# Patient Record
Sex: MALE | Race: WHITE
[De-identification: names, ages, dates, MRNs, and addresses within clinical notes are randomized per-mention and may not be internally consistent; named-entity substitution may affect disease eponyms.]

---

## 2020-02-10 ENCOUNTER — HOSPITAL ENCOUNTER (INPATIENT)
Dept: HOSPITAL 4 - SED | Age: 45
LOS: 8 days | Discharge: LEFT BEFORE DISCHARGE | DRG: 710 | End: 2020-02-18
Attending: INTERNAL MEDICINE | Admitting: INTERNAL MEDICINE
Payer: MEDICAID

## 2020-02-10 VITALS — SYSTOLIC BLOOD PRESSURE: 155 MMHG

## 2020-02-10 VITALS — BODY MASS INDEX: 41.78 KG/M2 | WEIGHT: 260 LBS | HEIGHT: 66 IN

## 2020-02-10 DIAGNOSIS — E87.1: ICD-10-CM

## 2020-02-10 DIAGNOSIS — I10: ICD-10-CM

## 2020-02-10 DIAGNOSIS — L03.114: ICD-10-CM
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
## 2020-02-10 DIAGNOSIS — A41.9: Primary | ICD-10-CM

## 2020-02-10 DIAGNOSIS — R73.03: ICD-10-CM

## 2020-02-10 DIAGNOSIS — F10.231: ICD-10-CM

## 2020-02-10 DIAGNOSIS — F29: ICD-10-CM

## 2020-02-10 DIAGNOSIS — E66.01: ICD-10-CM

## 2020-02-10 DIAGNOSIS — M71.122: ICD-10-CM

## 2020-02-10 LAB
ALBUMIN SERPL BCP-MCNC: 3.4 G/DL (ref 3.4–4.8)
ALT SERPL W P-5'-P-CCNC: 86 U/L (ref 12–78)
ANION GAP SERPL CALCULATED.3IONS-SCNC: 11 MMOL/L (ref 5–15)
AST SERPL W P-5'-P-CCNC: 38 U/L (ref 10–37)
BILIRUB SERPL-MCNC: 1.6 MG/DL (ref 0–1)
BUN SERPL-MCNC: 10 MG/DL (ref 8–21)
CALCIUM SERPL-MCNC: 8.7 MG/DL (ref 8.4–11)
CHLORIDE SERPL-SCNC: 87 MMOL/L (ref 98–107)
CREAT SERPL-MCNC: 0.81 MG/DL (ref 0.55–1.3)
ERYTHROCYTE [DISTWIDTH] IN BLOOD BY AUTOMATED COUNT: 12.9 % (ref 9–15)
GFR SERPL CREATININE-BSD FRML MDRD: 134 ML/MIN (ref 90–?)
GLUCOSE SERPL-MCNC: 185 MG/DL (ref 70–99)
HCT VFR BLD AUTO: 43.2 % (ref 36–54)
HGB BLD-MCNC: 14.8 G/DL (ref 14–18)
LIPASE SERPL-CCNC: 133 U/L (ref 73–393)
MCH RBC QN AUTO: 35 PG (ref 27–31)
MCHC RBC AUTO-ENTMCNC: 34 % (ref 32–36)
MCV RBC AUTO: 102 FL (ref 79–98)
PLATELET # BLD AUTO: 277 K/UL (ref 130–430)
POTASSIUM SERPL-SCNC: 3.1 MMOL/L (ref 3.5–5.1)
RBC # BLD AUTO: 4.23 MIL/UL (ref 4.2–6.2)
SODIUM SERPLBLD-SCNC: 122 MMOL/L (ref 136–145)
WBC # BLD AUTO: 26.8 K/UL (ref 4.8–10.8)

## 2020-02-10 PROCEDURE — G0482 DRUG TEST DEF 15-21 CLASSES: HCPCS

## 2020-02-10 RX ADMIN — SODIUM CHLORIDE SCH MLS/HR: 9 INJECTION, SOLUTION INTRAVENOUS at 23:39

## 2020-02-10 NOTE — NUR
Pt received resting in bed awoke alert OX1 to name Swedish speaking, able to make needs 
known. Pt was given Tylenol prior to the registry nurse receiving this pt. Pt is confused 
keep asking for Tylenol.

## 2020-02-10 NOTE — NUR
Pt  was assisted to the bathroom with a walker and staff. Ambulate with steady gait. No 
longer asking for Tylenol and is resting back in bed comfortably. Bed is in low position 
with wheels locked call light in reach.

## 2020-02-11 VITALS — SYSTOLIC BLOOD PRESSURE: 148 MMHG

## 2020-02-11 VITALS — SYSTOLIC BLOOD PRESSURE: 135 MMHG

## 2020-02-11 VITALS — SYSTOLIC BLOOD PRESSURE: 138 MMHG

## 2020-02-11 VITALS — SYSTOLIC BLOOD PRESSURE: 139 MMHG

## 2020-02-11 LAB
ANION GAP SERPL CALCULATED.3IONS-SCNC: 7 MMOL/L (ref 5–15)
BASOPHILS # BLD AUTO: 0.1 K/UL (ref 0–0.2)
BASOPHILS NFR BLD AUTO: 0.2 % (ref 0–2)
BASOPHILS NFR BLD MANUAL: 0 % (ref 0–2)
BUN SERPL-MCNC: 8 MG/DL (ref 8–21)
CALCIUM SERPL-MCNC: 8.4 MG/DL (ref 8.4–11)
CHLORIDE SERPL-SCNC: 94 MMOL/L (ref 98–107)
CHOLEST SERPL-MCNC: 166 MG/DL (ref ?–200)
CREAT SERPL-MCNC: 0.6 MG/DL (ref 0.55–1.3)
CRP SERPL-MCNC: 15.3 MG/DL (ref 0–0.5)
EOSINOPHIL # BLD AUTO: 0 K/UL (ref 0–0.4)
EOSINOPHIL NFR BLD AUTO: 0 % (ref 0–4)
EOSINOPHIL NFR BLD MANUAL: 0 % (ref 0–7)
ERYTHROCYTE [DISTWIDTH] IN BLOOD BY AUTOMATED COUNT: 13 % (ref 9–15)
GFR SERPL CREATININE-BSD FRML MDRD: 189 ML/MIN (ref 90–?)
GLUCOSE SERPL-MCNC: 139 MG/DL (ref 70–99)
HCT VFR BLD AUTO: 40.2 % (ref 36–54)
HDLC SERPL-MCNC: 68 MG/DL (ref 45–?)
HGB BLD-MCNC: 13.7 G/DL (ref 14–18)
LDL CHOLESTEROL: 77 MG/DL (ref ?–100)
LYMPHOCYTES # BLD AUTO: 0.8 K/UL (ref 1–5.5)
LYMPHOCYTES NFR BLD AUTO: 3.7 % (ref 20.5–51.5)
LYMPHOCYTES NFR BLD MANUAL: 2 % (ref 20–46)
MCH RBC QN AUTO: 35 PG (ref 27–31)
MCHC RBC AUTO-ENTMCNC: 34 % (ref 32–36)
MCV RBC AUTO: 103 FL (ref 79–98)
MONOCYTES # BLD MANUAL: 1.1 K/UL (ref 0–1)
MONOCYTES # BLD MANUAL: 10 % (ref 0–11)
MONOCYTES # BLD MANUAL: 5.1 % (ref 1.7–9.3)
NEUTROPHILS # BLD AUTO: 20.2 K/UL (ref 1.8–7.7)
NEUTROPHILS NFR BLD AUTO: 91 % (ref 40–70)
NEUTS BAND NFR BLD MANUAL: 12 % (ref 0–6)
PLATELET # BLD AUTO: 245 K/UL (ref 130–430)
POTASSIUM SERPL-SCNC: 3.2 MMOL/L (ref 3.5–5.1)
RBC # BLD AUTO: 3.9 MIL/UL (ref 4.2–6.2)
SODIUM SERPLBLD-SCNC: 127 MMOL/L (ref 136–145)
TRIGL SERPL-MCNC: 72 MG/DL (ref 30–150)
WBC # BLD AUTO: 22.2 K/UL (ref 4.8–10.8)

## 2020-02-11 RX ADMIN — Medication SCH EA: at 12:54

## 2020-02-11 RX ADMIN — SODIUM CHLORIDE SCH MLS/HR: 9 INJECTION, SOLUTION INTRAVENOUS at 13:12

## 2020-02-11 RX ADMIN — SODIUM CHLORIDE SCH MLS/HR: 9 INJECTION, SOLUTION INTRAVENOUS at 00:42

## 2020-02-11 RX ADMIN — SODIUM CHLORIDE SCH MLS/HR: 9 INJECTION, SOLUTION INTRAVENOUS at 20:36

## 2020-02-11 RX ADMIN — HUMAN INSULIN PRN UNITS: 100 INJECTION, SOLUTION SUBCUTANEOUS at 12:48

## 2020-02-11 NOTE — NUR
Pt remain resting in bed with eyes closed respiration even unlabored on room air and had an 
occasional dry cough. Family member remain with the pt at bed side. Will endorse to on 
coming daily shift nurse.

## 2020-02-11 NOTE — NUR
Pt received from [facility] AAOX[facility] lungs clear, abdomen soft, skin left forearm edema, reddish 
brown with small drainage. Pt's skin otherwise intact. Pt denies having pain was upt tp 
bathroom X1 voided. Family member at bed side. Pt's bed kept in low position with wheels 
locked call light in reach.

## 2020-02-11 NOTE — NUR
Patient will be admitted to care of Dr. Martin.  Admitted to Medsurg unit.  
Will go to room 124A.  Belongings list completed.  Complete and up to date 
summary report printed. SBAR report to be given at bedside with opportunity for 
questions.

## 2020-02-11 NOTE — NUR
Pt BIB family to ED C/O gradually worsening, constant left elbow pain with 
associated swelling for the past 3 days. Pain is mild, nonradiating. No 
alleviating or exacerbating factors. Patient reports using cold compress with 
no relief. 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 
lot No other complaints and or injuries noted VSS no s/s of acute distress 
Resting on gurney rails up

## 2020-02-11 NOTE — NUR
SS NOTES/DCP:



MSW was referred by nursing to see patient for DCP and homelessness. Demographic information 
confirmed (phone number: [phone]). No PCP.



MSW met with patient at bedside with girlfriend Kim around; pt ok'd. Pt was 
cooperative, alert and oriented x4. Pt appeared to be disheveled with normal mood, normal 
speeach, average eye contact and appropriate affect. 



Pt is a 45 y/o single  male who came in via ED for a bug bit on his left elbow 3 
days ago. Pt  is homeless and has been homeless for 4 years. Pt states he used to live in 
[place] but moved to [place] to be closer to his family. Pt states due to his drinking, 
his relationship with his family has been strained but does not believe that he has "no 
drinking problem". Pt states he is independent with his ADL's and does require/own any DME. 
Pt states he drinks 4 cans of beer/day for 10 years and believes he doesn't have a drinking 
problem. Pt states his only source of income is  at [employer] in [place] 
which he makes about $50-$100/day. Pt states he uses that money for food and shelter for 
himself and his girlfriend. Pt denies any history of mental health, and denies being 
sad/depressed currently. Pt states he stays at shelters from [place], [place] or 
[place]. Pt states his last shelter [facility] in [place]. Pt states he does not 
have a PCP and not connected to any pharmacy. Pt states he has weather appropriate clothing 
and received the flu vaccine at a shelter. Pt states he does not have insurance but was 
started with [facility] presumptive in the hospital with a barrier of lack of identification 
(ID and SS).



MSW informed pt that transportation and a meal can be provided upon discharge. MSW provided 
pt with Homeless Assistance resource, [facility] detox facilities, Community Clinics, [facility] 
Shelter list, Outpt Mental Health and Homeless Waiver. MSW also informed pt that the closest 
pharmacy is [facility] and accepts his insurance (ID number provided). MSW encouraged pt to 
contact a community clinic for a follow-up care and encouraged pt to follow up with Medical 
to get full scope Medical; pt understood and agreed. 

-------------------------------------------------------------------------------

Addendum: 02/11/20 at 1216 by Troy Hinton MSW

-------------------------------------------------------------------------------

When discharged, pt wishes to go back to his previous shelter in [place].

## 2020-02-11 NOTE — NUR
ADMISSION:



The patient, JOSETTE KEYS, 45 y/o, M admitted for Left elbow cellulitis by 
MICHELLE LIND MD, was given written information regarding hospital policies, unit 
procedures and contact persons.

## 2020-02-11 NOTE — NUR
report recieved @ start of shift, a/o/x/r, respirations even and unlabored, room air has 
hacking type cough non productive, left elbow edematous and tender to touch,denies pain @ 
this time, s/l patent intact in RAC,, tolerating IVPB ABT'S with no adverse reactions noted, 
po fluids encouraged and tolerated well,ambulates with steady gait to restroom, voiding 
clear yellow urine, wife @ bedside, SR'S up X'S 2, call light within reach and bed in low 
position, will continue to monitor for any s/s of distress.

## 2020-02-11 NOTE — NUR
CONSULTATION PAGED

REASON FOR CONSULTATION:ELBOW CELLULITIS

WAS CONSULT CALLED?Y

PERSON WHO WAS NOTIFIED:KELLY

CONSULTING PHYSICIAN:MICK OLVERA

CONSULTANT SPECIALTY:ID

CONSULTANT PHONE NUMBER:[phone]

REQUESTING PHYSICIAN:MICHELLE YATES

## 2020-02-12 VITALS — SYSTOLIC BLOOD PRESSURE: 122 MMHG

## 2020-02-12 VITALS — SYSTOLIC BLOOD PRESSURE: 127 MMHG

## 2020-02-12 VITALS — SYSTOLIC BLOOD PRESSURE: 109 MMHG

## 2020-02-12 VITALS — SYSTOLIC BLOOD PRESSURE: 131 MMHG

## 2020-02-12 LAB
ALBUMIN SERPL BCP-MCNC: 2.5 G/DL (ref 3.4–4.8)
ALT SERPL W P-5'-P-CCNC: 48 U/L (ref 12–78)
ANION GAP SERPL CALCULATED.3IONS-SCNC: 8 MMOL/L (ref 5–15)
AST SERPL W P-5'-P-CCNC: 26 U/L (ref 10–37)
BASOPHILS # BLD AUTO: 0.1 K/UL (ref 0–0.2)
BASOPHILS NFR BLD AUTO: 0.4 % (ref 0–2)
BILIRUB SERPL-MCNC: 1.4 MG/DL (ref 0–1)
BUN SERPL-MCNC: 8 MG/DL (ref 8–21)
CALCIUM SERPL-MCNC: 8.5 MG/DL (ref 8.4–11)
CHLORIDE SERPL-SCNC: 95 MMOL/L (ref 98–107)
CREAT SERPL-MCNC: 0.61 MG/DL (ref 0.55–1.3)
EOSINOPHIL # BLD AUTO: 0 K/UL (ref 0–0.4)
EOSINOPHIL NFR BLD AUTO: 0 % (ref 0–4)
ERYTHROCYTE [DISTWIDTH] IN BLOOD BY AUTOMATED COUNT: 12.8 % (ref 9–15)
GFR SERPL CREATININE-BSD FRML MDRD: 186 ML/MIN (ref 90–?)
GLUCOSE SERPL-MCNC: 135 MG/DL (ref 70–99)
HCT VFR BLD AUTO: 36.9 % (ref 36–54)
HGB BLD-MCNC: 12.6 G/DL (ref 14–18)
LYMPHOCYTES # BLD AUTO: 0.9 K/UL (ref 1–5.5)
LYMPHOCYTES NFR BLD AUTO: 4.5 % (ref 20.5–51.5)
MCH RBC QN AUTO: 35 PG (ref 27–31)
MCHC RBC AUTO-ENTMCNC: 34 % (ref 32–36)
MCV RBC AUTO: 103 FL (ref 79–98)
MONOCYTES # BLD MANUAL: 1.4 K/UL (ref 0–1)
MONOCYTES # BLD MANUAL: 7.1 % (ref 1.7–9.3)
NEUTROPHILS # BLD AUTO: 17.3 K/UL (ref 1.8–7.7)
NEUTROPHILS NFR BLD AUTO: 88 % (ref 40–70)
PLATELET # BLD AUTO: 242 K/UL (ref 130–430)
POTASSIUM SERPL-SCNC: 3.3 MMOL/L (ref 3.5–5.1)
RBC # BLD AUTO: 3.57 MIL/UL (ref 4.2–6.2)
SODIUM SERPLBLD-SCNC: 127 MMOL/L (ref 136–145)
WBC # BLD AUTO: 19.6 K/UL (ref 4.8–10.8)

## 2020-02-12 RX ADMIN — CLINDAMYCIN PHOSPHATE SCH MLS/HR: 150 INJECTION, SOLUTION INTRAMUSCULAR; INTRAVENOUS at 14:26

## 2020-02-12 RX ADMIN — SODIUM CHLORIDE SCH MLS/HR: 9 INJECTION, SOLUTION INTRAVENOUS at 17:19

## 2020-02-12 RX ADMIN — Medication SCH EA: at 05:45

## 2020-02-12 RX ADMIN — CLINDAMYCIN PHOSPHATE SCH MLS/HR: 150 INJECTION, SOLUTION INTRAMUSCULAR; INTRAVENOUS at 05:44

## 2020-02-12 RX ADMIN — SODIUM CHLORIDE SCH MLS/HR: 9 INJECTION, SOLUTION INTRAVENOUS at 03:31

## 2020-02-12 RX ADMIN — Medication SCH EA: at 00:00

## 2020-02-12 RX ADMIN — Medication SCH EA: at 11:22

## 2020-02-12 RX ADMIN — HUMAN INSULIN PRN UNITS: 100 INJECTION, SOLUTION SUBCUTANEOUS at 01:07

## 2020-02-12 RX ADMIN — CLINDAMYCIN PHOSPHATE SCH MLS/HR: 150 INJECTION, SOLUTION INTRAMUSCULAR; INTRAVENOUS at 22:00

## 2020-02-12 RX ADMIN — HUMAN INSULIN PRN UNITS: 100 INJECTION, SOLUTION SUBCUTANEOUS at 05:45

## 2020-02-12 RX ADMIN — Medication SCH EA: at 01:07

## 2020-02-12 RX ADMIN — Medication SCH EA: at 23:47

## 2020-02-12 RX ADMIN — ACETAMINOPHEN PRN MG: 650 SOLUTION ORAL at 14:35

## 2020-02-12 RX ADMIN — CLINDAMYCIN PHOSPHATE SCH MLS/HR: 150 INJECTION, SOLUTION INTRAMUSCULAR; INTRAVENOUS at 01:06

## 2020-02-12 RX ADMIN — Medication SCH EA: at 17:23

## 2020-02-12 RX ADMIN — POTASSIUM CHLORIDE SCH MEQ: 20 TABLET, EXTENDED RELEASE ORAL at 08:46

## 2020-02-12 RX ADMIN — ACETAMINOPHEN PRN MG: 650 SOLUTION ORAL at 03:42

## 2020-02-12 RX ADMIN — SODIUM CHLORIDE SCH MLS/HR: 9 INJECTION, SOLUTION INTRAVENOUS at 11:20

## 2020-02-12 NOTE — NUR
RN rounds/Medication

patient resting in bed, denies pain, educated on medication uses and potential side effects, 
he verbalized understanding, IV line is patent and infusing well, blood glucose checked, no 
insulin coverage per MD orders, no other needs at this time, bed in lowest position, two 
side rails up, call light within reach, fall and aspiration precautions in place, continuing 
to monitor.

## 2020-02-12 NOTE — NUR
Dietitian Recommendations



* Recommend increase regular diet

* Encourage increase PO intakes

* PM snacks daily 

LP, RD



Please refer to Nutrition Assessment for details.

-------------------------------------------------------------------------------

Addendum: 02/12/20 at 1340 by Rosanne Moss RD

-------------------------------------------------------------------------------

Amended: Links added.

## 2020-02-12 NOTE — NUR
medicated with tylenol 650 mg liq po for temp 100.4, po fluids encouraged and tolerated 
well, continued on IVPB ABT'S with no adverse reactions noted. denies pain, left elbow and 
arm remains edematous. and tender to the touch.

## 2020-02-12 NOTE — NUR
Medication

patient resting in bed, denies pain, educated on medication uses and potential side effects, 
he verbalized understanding and tolerated well, no other needs at this time, bed in lowest 
position, two side rails up, call light within reach, fall and aspiration precautions in 
place, continuing to monitor.

## 2020-02-12 NOTE — NUR
Blood sugar:



Patient's blood sugar is 124. Sliding scale insulin coverage not indicated. Call light with 
patient. Will continue to monitor.

## 2020-02-12 NOTE — NUR
resting quietly in bed with eyes closed, easily aroused, denies pain , continues to IVPB 
ABT'S with no adverse reactions noted, po fluids tolerated well hen offered., afebrile

## 2020-02-12 NOTE — NUR
Opening note

patient resting in bed, a/ox4, denies pain, assessment complete, IV line is patent and 
infusing well, left elbow is swollen and red, wound has scant drainage, educated the patient 
in infection prevent, offered the patient hygiene supplies, he states that he has some, 
educated the patient on call light system and on plan of care, he verbalized understanding 
at this time, bed in lowest position, two side rails up, call light within reach, fall and 
aspiration precautions in place, continuing to monitor.

## 2020-02-12 NOTE — NUR
Refused IV antibiotics:



Patient refused to receive Cleocin IV scheduled for 22:00. Patient stated that the swelling 
on his left arm had increased due to the antibiotics. Patient's IV site is on the right AC. 
Right arm assessed to be without any swelling or redness. Patient was educated regarding 
indications and side effects of Cleocin, and was informed that his infection is improving as 
evidenced by downtrending WBC count. Patient replied "I'm going home tomorrow, anyway", and 
requested to have the IV site to right AC removed. Patient was educated regarding importance 
of having an IV site in place. Patient verbalized understanding and agreed to keep the site 
until he is discharged. Call light is with patient. Will continue to monitor.

## 2020-02-12 NOTE — NUR
RN rounds

patient resting in bed, awake, denies pain, IV line is patent and infusing well, no other 
needs at this time, continuing to monitor, bed in lowest position, two side rails up, call 
light within reach, fall and aspiration precautions in place.

## 2020-02-12 NOTE — NUR
RN rounds/wound care

patient resting in chair at bedside, denies pain, performed wound care, patient tolerated 
well, no other needs at this time, bed in lowest position, call light within reach, fall and 
aspiration precautions in place.

## 2020-02-12 NOTE — NUR
Closing note

patient resting in bed, awake, no signs of pain, no signs of distress, all needs met, will 
endorse report to NOC shift nurse, bed in lowest position, two side rails up, call light 
within reach, fall and aspiration precautions in place. Offered patient linen change three 
times, he still states, "later on."

## 2020-02-12 NOTE — NUR
RN rounds/medication

patient resting in bed, awake, educated on IV antibiotic and PRN Tylenol he verbalized 
understanding tolerated well, IV line is patent and infusing well, will follow up with wound 
care, no other needs at this time, bed in lowest position, two side rails up, call light 
within reach, fall and aspiration precautions in place.

## 2020-02-13 VITALS — SYSTOLIC BLOOD PRESSURE: 134 MMHG

## 2020-02-13 VITALS — SYSTOLIC BLOOD PRESSURE: 147 MMHG

## 2020-02-13 VITALS — SYSTOLIC BLOOD PRESSURE: 131 MMHG

## 2020-02-13 VITALS — SYSTOLIC BLOOD PRESSURE: 149 MMHG

## 2020-02-13 VITALS — SYSTOLIC BLOOD PRESSURE: 132 MMHG

## 2020-02-13 RX ADMIN — SODIUM CHLORIDE SCH MLS/HR: 9 INJECTION, SOLUTION INTRAVENOUS at 17:05

## 2020-02-13 RX ADMIN — Medication SCH EA: at 17:19

## 2020-02-13 RX ADMIN — CLINDAMYCIN PHOSPHATE SCH MLS/HR: 150 INJECTION, SOLUTION INTRAMUSCULAR; INTRAVENOUS at 06:00

## 2020-02-13 RX ADMIN — POTASSIUM CHLORIDE SCH MEQ: 20 TABLET, EXTENDED RELEASE ORAL at 09:59

## 2020-02-13 RX ADMIN — Medication SCH EA: at 23:58

## 2020-02-13 RX ADMIN — SODIUM CHLORIDE SCH MLS/HR: 9 INJECTION, SOLUTION INTRAVENOUS at 10:00

## 2020-02-13 RX ADMIN — Medication SCH EA: at 06:01

## 2020-02-13 RX ADMIN — SODIUM CHLORIDE SCH MLS/HR: 9 INJECTION, SOLUTION INTRAVENOUS at 02:00

## 2020-02-13 RX ADMIN — CLINDAMYCIN PHOSPHATE SCH MLS/HR: 150 INJECTION, SOLUTION INTRAMUSCULAR; INTRAVENOUS at 14:00

## 2020-02-13 RX ADMIN — CLINDAMYCIN PHOSPHATE SCH MLS/HR: 150 INJECTION, SOLUTION INTRAMUSCULAR; INTRAVENOUS at 23:56

## 2020-02-13 RX ADMIN — Medication SCH EA: at 11:38

## 2020-02-13 NOTE — NUR
Closing note:



Patient is awake, laying in bed. No acute distress. Tolerating room air. IV site to right AC 
is patent and intact. Patient removed his patient ID wristband. He refused a new wristband 
to be placed, and refused receiving scheduled Cleocin IV. Patient stated that he is leaving 
today. Patient refused a new dressing to be placed on his elbow despite education, stated 
that his "elbow is clean". All needs met. Safety and fall precautions in place. Will endorse 
care to dayshift RN.

## 2020-02-13 NOTE — NUR
INITIAL NOTE



AT INITIAL ASSESSMENT, PATIENT IS RESTING IN BED, STABLE, NO SIGNS OF RESPIRATORY DISTRESS. 
PATIENT VERBALIZES NO PAIN AT THIS TIME. PLAN OF CARE FOR THE EVENING IS COMMUNICATED WITH 
THE PATIENT AND HIS GIRLFRIEND AT BEDSIDE. PATIENT SUCCESSFULLY DEMONSTRATES CORRECT USAGE 
OF CALL LIGHT AT THIS TIME. BED IS LOCKED, AND AT THE LOWEST LEVEL. PATIENT IS REFUSING BED 
ALARM AT THIS TIME DESPITE EDUCATIONAL EFFORTS. FALL AND SAFETY PRECAUTIONS WILL BE IN PLACE 
THROUGHOUT THE SHIFT.

## 2020-02-13 NOTE — NUR
WOUND EVALUATION:



Wound Consult received from Dr. Martin. Thank you, Dr. Martin, for the consult.



Patient received in a Catano Bed with an Atmos-Air 9000 mattress, awake, alert, and 
oriented. Patient is able to turn in bed and ambulate independently. Nader Score is a 21. 
Past Medical History: Hypertension. Admitted with erythema and swelling of the left elbow. 
Recent Labs: WBC 19.6, RBC 3.57, hemoglobin 12.6, hematocrit 36.9, sodium 127, potassium 
3.3, chloride 95, glucose 135, POC glucose 129, albumin 2.5. Microbiology: Blood culture 
results 2 in progress. MRSA screen results in progress. Intrinsic factors that delay wound 
healing: Hypoalbuminemia. Patient is on IV antibiotics.





Wound Assessment:



1. Left Upper Extremity/Olecranon:

Erythema, mild calor, and mild edema, present on admission. No open wound present. Small 
chronic wound present on olecranon area with 100% yellow eschar. No odor, no drainage. 
Periwound intact. Dry, stable. No weeping. 



Recommend: No dressing needed. Continue to monitor site every shift. Encourage patient to 
elevate the involved extremity as much as possible (minimum 3 hours a day, twice a day 
recommended).





Also recommend: Reposition patient every 2 hours with pillow support and off-load pressure 
areas with pillows for pressure re-distribution. Offload, elevate and float bilateral heels 
with pillows. Perform skin care and monitor skin integrity Q shift. Use moisture barrier 
cream on buttocks and other moisture susceptible areas QID and as needed for soiling. Place 
patient on a low air-loss mattress.

## 2020-02-13 NOTE — NUR
NOTE



SCHEDULED MEDICATIONS ARE GIVEN AT THIS TIME. PATIENT IS RESTING IN BED, STABLE, NO SIGNS OF 
RESPIRATORY DISTRESS. GIRLFRIEND IS AT BEDSIDE. CALL LIGHT WITHIN REACH. BED IS LOCKED AND 
AT THE LOWEST LEVEL.

## 2020-02-13 NOTE — NUR
RN ROUNDS

PT SITTING AT BEDSIDE. PT COOPERATIVE AND ALLOWING IV ANTIBIOTICS TO INFUSE. REENFORCE 
EDUCATION ON ELEVATING LEFT ELBOW. PT VERBALIZED UNDERSTANDING.

## 2020-02-13 NOTE — NUR
RN ROUNDS

PT TOOK MORNING MEDICATIONS AND ALLOWED TO START IV ANTIBIOTICS. EDUCATED PT ON ELEVATING 
LEFT ARM, PT DEMONSTRATED FEED BACK.

## 2020-02-13 NOTE — NUR
Rounds:



Patient is resting in bed, no acute distress. Tolerating room air. Call light with patient. 
Will continue to monitor.

## 2020-02-13 NOTE — NUR
Refused IV antibiotics:



Patient refused Vancomycin IV scheduled for 02:00 for the same reason as earlier. Education 
was provided regarding indications and side effects, patient still refused. Call light is 
with patient. Will continue monitoring.

## 2020-02-13 NOTE — NUR
DR. ILND

SPOKE W/ MD VIA PHONE, INFORMED MD THAT CONSULT FOR DR. MORRIS/DR. INGRAM DO NOT TAKE THE PTS 
INSURANCE. MD TO FOLLOW UP ON THIS MATTER.

## 2020-02-13 NOTE — NUR
RN ROUNDS

PT COMPLAINING OF NAUSEA. EDUCATED PT ON USES AND SIDE EFFECTS OF ZOFRAN. PT VERBALIZED 
UNDERSTANDING. PRN ZOFRAN ADMINISTERED, WILL CONTINUE TO MONITOR. 

-------------------------------------------------------------------------------

Addendum: 02/13/20 at 1856 by Linda Spence RN

-------------------------------------------------------------------------------

WRONG PT ENTRY

NO CHANGE IN ASSESSMENT.

## 2020-02-13 NOTE — NUR
RN ROUNDS

DISCONNECT PT FROM IV, PT USING RESTROOM, RECONNECTED PT TO IV ANTIBIOTICS. NO CHANGE IN 
ASSESSMENT.

## 2020-02-13 NOTE — NUR
INITIAL NOTE

PT AWAKE, PT STATES HE WANTS TO REMOVED IV CATHETER. EDUCATED PT ON SAFETY AND USE OF IV 
ACCESS DURING HOSPITAL STAY. PT VERBALIZED UNDERSTANDING. CALL LIGHT WITHIN REACH, BED IN 
LOW LOCKED POSITION. EDUCATED PT ON SAFETY AND PURPOSE OF BED ALARM, PT VERBALIZED 
UNDERSTANDING, PT REFUSING BED ALARM AT THIS TIME.

## 2020-02-13 NOTE — NUR
CLOSING NOTE

PT AWAKE, SITTING AT EDGE OF BED, IV ANTIBIOTICS INFUSING WELL. CALL LIGHT WITHIN REACH, BED 
IN LOW LOCKED POSITION. EDUCATED PT ON SAFETY AND PURPOSE OF BED ALARM, PT VERBALIZED 
UNDERSTANDING, PT REFUSING BED ALARM AT THIS TIME. ALL NEEDS MET THROUGHOUT SHIFT. WILL 
CONTINUE TO MONITOR UNTIL PT CARE IS ENDORSED TO NIGHT SHIFT RN.

## 2020-02-13 NOTE — NUR
NEW IV INSERTION/ BEHAVIOR



DURING HOURLY ROUNDING, PATIENT'S IV IS NOTED TO HAVE BEEN PULLED OUT. WHEN QUESTIONED, 
PATIENT WAS ANXIOUS AND VERBALIZED HE CUT THE IV TUBING WITH HIS TEETH BECAUSE HE WAS 
CONFUSED ABOUT THE TUBING WHEN HE WOKE UP. REORIENTATION EFFORTS WERE SUCCESSFUL. GIRLFRIEND 
REMAINS AT BEDSIDE, SAFETY EDUCATION PROVIDED. WILL CONTINUE TO MONITOR CLOSELY.

## 2020-02-13 NOTE — NUR
CONSULT ORTHO

I&D  CELLULITIS LEFT ELBOW

ISABEL FITZPATRICK [phone] DR INGRAM ON CALL

S/W OLIVIA IN OFFICE DR MORRIS AND DR INGRAM DO NOT TAKE THE PATIENT'S INSURANCE

## 2020-02-14 VITALS — SYSTOLIC BLOOD PRESSURE: 131 MMHG

## 2020-02-14 VITALS — SYSTOLIC BLOOD PRESSURE: 139 MMHG

## 2020-02-14 VITALS — SYSTOLIC BLOOD PRESSURE: 141 MMHG

## 2020-02-14 VITALS — SYSTOLIC BLOOD PRESSURE: 147 MMHG

## 2020-02-14 VITALS — SYSTOLIC BLOOD PRESSURE: 135 MMHG

## 2020-02-14 LAB
ALBUMIN SERPL BCP-MCNC: 2.8 G/DL (ref 3.4–4.8)
ALT SERPL W P-5'-P-CCNC: 64 U/L (ref 12–78)
AMPHETAMINES UR QL SCN: NEGATIVE
ANION GAP SERPL CALCULATED.3IONS-SCNC: 9 MMOL/L (ref 5–15)
AST SERPL W P-5'-P-CCNC: 57 U/L (ref 10–37)
BARBITURATES UR QL SCN: NEGATIVE
BASOPHILS # BLD AUTO: 0.1 K/UL (ref 0–0.2)
BASOPHILS NFR BLD AUTO: 0.4 % (ref 0–2)
BENZODIAZ UR QL SCN: NEGATIVE
BILIRUB SERPL-MCNC: 0.6 MG/DL (ref 0–1)
BUN SERPL-MCNC: 8 MG/DL (ref 8–21)
BZE UR QL SCN: NEGATIVE
CALCIUM SERPL-MCNC: 8.8 MG/DL (ref 8.4–11)
CANNABINOIDS UR QL SCN: NEGATIVE
CHLORIDE SERPL-SCNC: 98 MMOL/L (ref 98–107)
CREAT SERPL-MCNC: 0.56 MG/DL (ref 0.55–1.3)
EOSINOPHIL # BLD AUTO: 0.1 K/UL (ref 0–0.4)
EOSINOPHIL NFR BLD AUTO: 0.7 % (ref 0–4)
ERYTHROCYTE [DISTWIDTH] IN BLOOD BY AUTOMATED COUNT: 13.1 % (ref 9–15)
ETHANOL SERPL-MCNC: < 3 MG/DL (ref ?–10)
GFR SERPL CREATININE-BSD FRML MDRD: 205 ML/MIN (ref 90–?)
GLUCOSE SERPL-MCNC: 114 MG/DL (ref 70–99)
HCT VFR BLD AUTO: 39.5 % (ref 36–54)
HGB BLD-MCNC: 13.4 G/DL (ref 14–18)
LYMPHOCYTES # BLD AUTO: 1.5 K/UL (ref 1–5.5)
LYMPHOCYTES NFR BLD AUTO: 12.3 % (ref 20.5–51.5)
MCH RBC QN AUTO: 35 PG (ref 27–31)
MCHC RBC AUTO-ENTMCNC: 34 % (ref 32–36)
MCV RBC AUTO: 104 FL (ref 79–98)
METHADONE UR-SCNC: NEGATIVE UMOL/L
METHAMPHET UR-SCNC: NEGATIVE UMOL/L
MONOCYTES # BLD MANUAL: 1.7 K/UL (ref 0–1)
MONOCYTES # BLD MANUAL: 13.6 % (ref 1.7–9.3)
NEUTROPHILS # BLD AUTO: 8.9 K/UL (ref 1.8–7.7)
NEUTROPHILS NFR BLD AUTO: 73 % (ref 40–70)
OPIATES UR QL SCN: NEGATIVE
OXYCODONE SERPL-MCNC: NEGATIVE NG/ML
PCP UR QL SCN: NEGATIVE
PLATELET # BLD AUTO: 321 K/UL (ref 130–430)
POTASSIUM SERPL-SCNC: 3.5 MMOL/L (ref 3.5–5.1)
RBC # BLD AUTO: 3.79 MIL/UL (ref 4.2–6.2)
SODIUM SERPLBLD-SCNC: 134 MMOL/L (ref 136–145)
TRICYCLICS UR-MCNC: NEGATIVE NG/ML
URINE PROPOXYPHENE SCREEN: NEGATIVE
VANCOMYCIN TROUGH SERPL-MCNC: 13.3 UG/ML (ref 5–10)
WBC # BLD AUTO: 12.3 K/UL (ref 4.8–10.8)

## 2020-02-14 RX ADMIN — SODIUM CHLORIDE SCH MLS/HR: 9 INJECTION, SOLUTION INTRAVENOUS at 09:36

## 2020-02-14 RX ADMIN — Medication SCH EA: at 11:40

## 2020-02-14 RX ADMIN — POTASSIUM CHLORIDE SCH MEQ: 20 TABLET, EXTENDED RELEASE ORAL at 08:24

## 2020-02-14 RX ADMIN — Medication SCH EA: at 17:22

## 2020-02-14 RX ADMIN — SODIUM CHLORIDE SCH MLS/HR: 9 INJECTION, SOLUTION INTRAVENOUS at 17:23

## 2020-02-14 RX ADMIN — CLINDAMYCIN PHOSPHATE SCH MLS/HR: 150 INJECTION, SOLUTION INTRAMUSCULAR; INTRAVENOUS at 23:32

## 2020-02-14 RX ADMIN — CLINDAMYCIN PHOSPHATE SCH MLS/HR: 150 INJECTION, SOLUTION INTRAMUSCULAR; INTRAVENOUS at 06:13

## 2020-02-14 RX ADMIN — CLINDAMYCIN PHOSPHATE SCH MLS/HR: 150 INJECTION, SOLUTION INTRAMUSCULAR; INTRAVENOUS at 13:46

## 2020-02-14 RX ADMIN — Medication SCH EA: at 06:13

## 2020-02-14 RX ADMIN — SODIUM CHLORIDE SCH MLS/HR: 9 INJECTION, SOLUTION INTRAVENOUS at 01:58

## 2020-02-14 NOTE — NUR
CLOSING NOTE



PATIENT GOT MORE CONFUSED THROUGHOUT THE SHIFT, MD WAS MADE AWARE. HE IS MOVED TO A ROOM 
CLOSER TO THE NURSING STATION. HE IS CURRENTLY STABLE, NO SIGNS OF RESPIRATORY DISTRESS. 
CALL LIGHT WITHIN REACH. BED IS LOCKED, AND AT THE LOWEST LEVEL. FALL AND SAFETY PRECAUTIONS 
HAVE BEEN IN PLACE THROUGHOUT THE SHIFT.

## 2020-02-14 NOTE — NUR
OPENING NOTES:

RECEIVED PATIENT FROM NIGHT SHIFT NURSE. PATIENT IS AWAKE AND ALERT x1 SITTING DOWN IN HIS 
BED. PATIENT DENIES ANY PAIN AT THE MOMENT. NO SIGNS OF DISTRESS OR SHORTNESS OF BREATH 
NOTED. PATIENT IS TOLERATING OXYGEN AT ROOM AIR. IV SITE IS PATENT WITH NO SIGNS OF 
INFILTRATION NOTED. PATIENT IN STABLE CONDITION. SAFETY, FALL AND ASPIRATION PRECAUTIONS ARE 
IN PLACE. BED LOCKED IN LOWEST POSITION WITH CALL LIGHT IN REACH. WILL CONTINUE TO MONITOR 
PATIENT FOR ANY CHANGES.

## 2020-02-14 NOTE — NUR
CONSULTATION PAGED/CALLED

Reason for Consultation: []  I AND D

Person Who was Notified: []  BIANCA

Consulting Physician: []   DR MONZON, ON CALL FOR DR INGRAM

Consultant Specialty: []  ORTHO

Ordering Physician: []  DR NURA LIND

## 2020-02-14 NOTE — NUR
CONSULTATION PAGED/CALLED

Reason for Consultation: []  I AND D

Person Who was Notified: []  LEFT A VM ON A CELL

Consulting Physician: []   DR ANJALI INGRAM

Consultant Specialty: []  ORTHO

Ordering Physician: []  DR NURA LIND

## 2020-02-14 NOTE — NUR
ATTENDING MD DR LIND WAS CALLED, RE:  PSYCH CONSULT WITH DR PIRES WHO WAS ROUNDING AT THE 
MOMENT.  SPOKE

TO GLORY

## 2020-02-14 NOTE — NUR
RN ROUNDS:

PATIENT IS AWAKE AND ALERT x2 SITTING IN A CHAIR AT BEDSIDE. FAMILY AT BEDSIDE. SITTER AT 
BEDSIDE. NO SIGNS OF DISTRESS OR SHORTNESS OF BREATH NOTED. PATIENT IN STABLE CONDITION. 
WILL CONTINUE TO MONITOR PATIENT FOR ANY CHANGES.

## 2020-02-14 NOTE — NUR
RN ROUNDS:

PATIENT IS AWAKE AND ALERT x1 LAYING DOWN IN BED. FAMILY AT BEDSIDE LAYING DOWN ON THE 
FLOOR. PATIENT DENIES ANY PAIN AT THE MOMENT. IV SITE IS PATENT WITH NO SIGNS OF 
INFILTRATION NOTED. PATIENT IN STABLE CONDITION. WILL CONTINUE TO MONITOR PATIENT FOR ANY 
CHANGES.

## 2020-02-14 NOTE — NUR
: MSW met pt. as he was trying to leave the hospital. Pt. was at the end of 
the blas with two security offices, two Rn's and someone else.

MSW tried to de-escalate the situation, listened to pt. speak with security. Pt. stated all 
he needs is his beer. He said he did not want to stay at the hospital. He was loud yelling 
and stated he wanted to leave. Pt. has a 5150. MSW asked pt. if he would go back to his room 
so that he can speak to Rn. and security re. his continued care. MSW asked him to be 
thoughtful of the other pts. and his own privacy. Pt. said he would go back to his room.



MSW had to clock out . When MSANJALI returned, she met with pt. and his wife in his room. Pt was 
still stating he wanted to leave. He said he did not like it at the hospital. He wanted to 
go the shelter where he can take a shower, eat and sleep. MSW said they don't have the 
doctors there. He said well.... MSW asked his wife if she could help her and keep her 
 at this facility so that he can get the medical attention that he needs. She only 
said, "He is fine" . Pt. agreed to stay at the hospital until he is medically cleared. He 
said he will not cause any trouble. Security ensured he and his wife knew what MALIK was 
asking of them as he spoke to them in Luxembourgish.  MSW gave him resources for homelessness and 
spoke to him about drinking too much. Pt. stated he drank about 10-15 bottles of beer daily. 






In looking at this chart, MALIK Troy spoke to and completed a DCPA on this pt. earlier in the 
week.  MSW will remain available as needed.

## 2020-02-14 NOTE — NUR
PATIENT BEHAVIOR/ 5150 ORDERED



PATIENT IS MORE CONFUSED THIS MORNING. PATIENT WAS FOUND LEANING ON THE SIDE OF HIS BED, 
ANXIOUS, HIS ENTIRE IV POLE WAS LAYING DOWN HORIZONTALLY ACROSS THE BED. PATIENT'S 
GIRLFRIEND IS SLEEPING ON THE FLOOR, SHE IS YELLING AT THE PATIENT, IT SEEMS HE HAS PEED ALL 
OVER THE FLOOR NEXT TO WHERE SHE IS SLEEPING. PATIENT WAS ABLE TO GO TO THE BATHROOM MANY 
TIMES THROUGHOUT THE NIGHT PRIOR TO THIS INCIDENT. WHEN ASKED, WHY THE IV POLE WAS IN THE 
BED, PATIENT ANSWERS SAYS HE WAS "COMING FROM HOME DEPOT, I'M PUTTING THIS IS MY TRUCK BED 
TO MEET MY FRIEND" REORIENTATION EFFORTS HAD NO SUCCESS. DR. LIND IS CALLED FOR PSYCH 
CONSULT, DR. CAIN IS ON SITE AND SEES PATIENT IMMEDIATELY. DR. PIRES PLACES 5150 HOLD FOR 
PATIENT AT THIS TIME. CHARGE NURSE FARHAT, HOUSE SUPERVISOR DEEPALI ARE BOTH AWARE.

## 2020-02-14 NOTE — NUR
Received report from Day Shift RN. Pt received in stable condition. Pt in bed w kerlix 
wrapping around both wrists (one for holding saline lock in place). Left arm red, warm to 
touch, and swollen. Pt w/o c/o any pain. Pt did ask "is the man over there still there?" - 
pointing to bed B. Pt hallucinates, but was reassured the man is gone.



IV Vancomycin continuing to infuse. Pt tolerating ABx Tx well.



Pt's spouse/girlfriend/significant other present at bedside. No behavioral issues at this 
time.

## 2020-02-14 NOTE — NUR
NOTE



PATIENT IS SLEEPING, STABLE, NO SIGNS OF RESPIRATORY DISTRESS. GIRLFRIEND REMAINS AT 
BEDSIDE. CALL LIGHT IS WITHIN REACH. BED AND THE LOWEST LEVEL.

## 2020-02-14 NOTE — NUR
CLOSING NOTES:

PATIENT IS AWAKE SITTING DOWN IN A CHAIR EATING DINNER. FAMILY AT BEDSIDE. SITTER AT 
BEDSIDE. PATIENT DENIES ANY PAIN AT THE MOMENT. IV SITE IS PATENT WITH NO SIGNS OF 
INFILTRATION NOTED. PATIENT ATTEMPTED TO PULL OUT IV AND WAS REEDUCATED ON WHY IT NEEDS TO 
STAY IN. PATIENT IN STABLE CONDITION. SAFETY, FALL, AND ASPIRATION PRECAUTIONS REMAINED IN 
PLACE THROUGHOUT THE SHIFT. WILL ENDORSE PATIENT CARE TO ONCOMING NIGHT SHIFT NURSE.

## 2020-02-14 NOTE — NUR
RN ROUNDS:

PATIENT WAS WALKING DOWN THE HALLWAY TRYING TO LEAVE OUT OF THE EMERGENCY EXIT. ATTEMPTED TO 
DIRECT THE PATIENT BACK TO HIS ROOM. PATIENT REFUSED AND ATTEMPTED TO WALK OUT TO THE LOBBY. 
SECURITY WAS CALLED, PATIENT WAS EDUCATED THAT HE WAS ON A HOLD AND COULD NOT LEAVE. 
SECURITY ESCORTED HIM BACK TO HIS ROOM. PATIENT STATES " HE JUST WANTS TO GO HOME AND DRINK 
A BEER". PATIENT IS SITTING IN A CHAIR IN HIS ROOM. NO SIGNS OF DISTRESS OR SHORTNESS OF 
BREATH NOTED. PATIENT IN STABLE CONDITION. WILL CONTINUE TO MONITOR PATIENT FOR ANY CHANGES

## 2020-02-14 NOTE — NUR
vancomycin ivpb completed. pt then pulled out saline lock. pt appeared getting ready to 
leave. pt re-assured that this is not the time to go home. reminded pt he has an infection 
and that should he leave it will get worse and he will return. 

awaiting supplies for saline lock restart.

## 2020-02-14 NOTE — NUR
NOTE



PATIENT IS PACING IN HIS ROOM, ANXIOUS BUT STABLE, NO SIGNS OF RESPIRATORY DISTRESS. 
GIRLFRIEND REMAINS AT BEDSIDE. CALL LIGHT IS WITHIN REACH. BED AND THE LOWEST LEVEL.

## 2020-02-14 NOTE — NUR
RN ROUNDS:

PATIENT IS AWAKE AND WALKING AROUND HIS ROOM AND MAKING HIS BED. FAMILY MEMBER IS SLEEPING 
ON THE FLOOR. PATIENT IS HALLUCINATING. SITTER AT BEDSIDE. NO SIGNS OF DISTRESS OR SHORTNESS 
OF BREATH NOTED. PATIENT IS IN STABLE CONDITION. WILL CONTINUE TO MONITOR PATIENT FOR ANY 
CHANGES.

## 2020-02-14 NOTE — NUR
PATIENT CONFUSED:

PATIENT WAS WANDERING THE HALLS AND ATTEMPTED TO WALK OUT OF THE EMERGENCY DOOR. PATIENT WAS 
ASSISTED BACK INTO HIS ROOM. PATIENT STATED " I NEED TO GO OUTSIDE". PATIENT WAS EDUCATED AS 
TO WHY HE NEEDED TO STAY INSIDE HIS ROOM. PATIENT FINALLY AGREED TO GO BACK INSIDE. NO SIGNS 
OF DISTRESS OR SHORTNESS OF BREATH NOTED. PATIENT IN STABLE CONDITION. WILL CONTINUE TO 
MONITOR PATIENT FOR ANY CHANGES.

## 2020-02-14 NOTE — NUR
Restart of SL to RFA using 22G angiocath. Pt tolerated well. Site connected to new IV 
primary line and began infusing Cleocin 900mg IVPB w/o incident. Site patent, w/o pain or 
redness, and w/o resistance. Pt tolerating infusion well.

## 2020-02-15 VITALS — SYSTOLIC BLOOD PRESSURE: 142 MMHG

## 2020-02-15 VITALS — SYSTOLIC BLOOD PRESSURE: 150 MMHG

## 2020-02-15 VITALS — SYSTOLIC BLOOD PRESSURE: 157 MMHG

## 2020-02-15 RX ADMIN — SODIUM CHLORIDE SCH MLS/HR: 9 INJECTION, SOLUTION INTRAVENOUS at 18:22

## 2020-02-15 RX ADMIN — DEXTROSE MONOHYDRATE SCH MLS/HR: 50 INJECTION, SOLUTION INTRAVENOUS at 15:03

## 2020-02-15 RX ADMIN — SODIUM CHLORIDE SCH MLS/HR: 9 INJECTION, SOLUTION INTRAVENOUS at 01:38

## 2020-02-15 RX ADMIN — POTASSIUM CHLORIDE SCH MEQ: 20 TABLET, EXTENDED RELEASE ORAL at 12:22

## 2020-02-15 RX ADMIN — CLINDAMYCIN PHOSPHATE SCH MLS/HR: 150 INJECTION, SOLUTION INTRAMUSCULAR; INTRAVENOUS at 13:43

## 2020-02-15 RX ADMIN — Medication SCH EA: at 05:12

## 2020-02-15 RX ADMIN — HUMAN INSULIN PRN UNITS: 100 INJECTION, SOLUTION SUBCUTANEOUS at 05:15

## 2020-02-15 RX ADMIN — HUMAN INSULIN PRN UNITS: 100 INJECTION, SOLUTION SUBCUTANEOUS at 01:13

## 2020-02-15 RX ADMIN — CLINDAMYCIN PHOSPHATE SCH MLS/HR: 150 INJECTION, SOLUTION INTRAMUSCULAR; INTRAVENOUS at 05:12

## 2020-02-15 RX ADMIN — DEXTROSE MONOHYDRATE SCH MLS/HR: 50 INJECTION, SOLUTION INTRAVENOUS at 22:55

## 2020-02-15 RX ADMIN — Medication SCH EA: at 01:11

## 2020-02-15 RX ADMIN — Medication SCH EA: at 12:07

## 2020-02-15 RX ADMIN — CLINDAMYCIN PHOSPHATE SCH MLS/HR: 150 INJECTION, SOLUTION INTRAMUSCULAR; INTRAVENOUS at 21:22

## 2020-02-15 RX ADMIN — Medication SCH EA: at 18:21

## 2020-02-15 RX ADMIN — Medication SCH EA: at 23:24

## 2020-02-15 RX ADMIN — SODIUM CHLORIDE SCH MLS/HR: 9 INJECTION, SOLUTION INTRAVENOUS at 09:29

## 2020-02-15 NOTE — NUR
MD paged



paged for Dr Montano, dialed [phone]. s/w Dee, stated that Dr Jones is on-call. Tetanus and HPV shots today.    Letter written today.    Follow up yearly or biannually for a complete physcial exam.    Camilo Turner MD  Internal Medicine and Pediatrics

## 2020-02-15 NOTE — NUR
pt was irritable, impulsive, and attempted to go AMA by pushing through doorway of room and 
two RNs. Pt then tried walking through alarmed door. Pt stopped by alarm and escorted back 
to room. Pt then given 1mg Ativan IVP for disruptive behavior. Pt then had FS done (169) and 
given RISS coverage (2units). Pt then given IVPB cleocin as ordered for infection. 



Pt and spouse again with non-productive coughs.

-------------------------------------------------------------------------------

Addendum: 02/15/20 at 0545 by Zero Registry RN

-------------------------------------------------------------------------------

Ativan 1mg IVP effective. pt currently sleeping. Pt w/o any complaints, impulsive/disruptive 
behavior. Pt's auditory/visual hallucinations currently appear reduced. Will continue to 
monitor. Respirations regular, even, unlabored.

## 2020-02-15 NOTE — NUR
PATIENT IS SEEN SITTING ON THE SIDE OF THE BED WITH SITTER'S PRESENCE. TOLERATING WITHOUT 
DISTRESS.

## 2020-02-15 NOTE — NUR
Med pass:



Patient was administered due PO medications. Patient was cooperative, tolerated well. 
Safety, fall precautions in place. Sitter at bedside. Will continue monitoring.

## 2020-02-15 NOTE — NUR
pt appears focused on leaving - at one point pt tried unwrapping newly installed saline 
lock. pt redirected towards leaving it alone after multiple times. Later, pt tried walking 
out of room, but was blocked by sitter. Pt mentioned during this attempt that "I'm going to 
see a friend." Pt again redirected back to chair where he was sitting.

## 2020-02-15 NOTE — NUR
CHANGE OF SHIFT REPORT

Pt is...

1. Restless throughout shift. 

2. Somewhat medication compliant if re-instructed on purpose, risks and benefits.

3. Forgetful - 

pt forgets instructions easily;

pt needs to be shown where the Restroom each time.

4. Impulsive -  pt attempts leaving room "to go outside."

5. Alert, Oriented to Name, Place, Event (but not to Time).

And...

-Pt and spouse both have occasional coughs. Both are non-productive.

-Pt often tampers with saline lock whether in direct view or not.

-Pt often talks/laughs to himself as if in dialogue.

## 2020-02-15 NOTE — NUR
Dr. Jones:



Spoke with Dr. Jones, on-call for Dr. Montano. This RN questioned I and D scheduled for 
2/16/20 as the procedure presents with high risk for complications d/t patient exhibiting 
DTs over past several days. Per MD, patient is still to undergo I&D as ordered. All orders 
from Dr. Montano still stand. Will continue monitoring.

## 2020-02-15 NOTE — NUR
Blood sugar:



Patient's blood sugar is 118. Sliding scale insulin not indicated. Sitter at bedside. Will 
continue monitoring.

## 2020-02-15 NOTE — NUR
pt tolerated ivpb cleocin well, earlier. pt now refusing vancomycin ivpb for no reason other 
than to refuse. pt wants to go wander around outside (AMA).



pt has occasional cough - non-productive. will cont to monitor encourage abx tx.

-------------------------------------------------------------------------------

Addendum: 02/15/20 at 0230 by Zero Registry RN

-------------------------------------------------------------------------------

pt finally agreed to allow the vancomycin ivpb to infuse after some negotiation.

-------------------------------------------------------------------------------

Addendum: 02/15/20 at 0237 by Zero Registry RN

-------------------------------------------------------------------------------

After a few minutes of vancomycin infusion, pt began tampering with saline lock (by 
beginning to unroll ace bandage around the saline lock site). Pt was stopped from completing 
this action (twice). Pt continues to require close 1:1 observation.

## 2020-02-15 NOTE — NUR
Pt appears to be suffering from DTs. Day RN will be endorsed to contact Psychiatrist on case 
regarding appropriate treatment/medication regimen for this pt. 

Pt originally reported drinking only 4 beers per day in earlier notes.

Pt later reported in 's note on 02/14/20 @ 6102 to be "10-15 bottles of beer 
daily."

## 2020-02-15 NOTE — NUR
Initial note:



Received report from kenrick RN. Patient is awake, alert and oriented to name, place, and 
event. Tolerating room air. IV site to right forearm patent and benign, receiving IV 
antibiotics as ordered. Left arm edema and erythema noted. Sitter at bedside for patient 
safety. Safety, fall precautions in place. Will continue with plan of care.

## 2020-02-15 NOTE — NUR
PATIENT ASLEEP, BUT EASILY AROUABLE. CONFUSED WHEN AWAKE. IV SITE IS PATENT WITH NO SIGNS OF 
INFILTRATION NOTED. SITTER AT BEDSIDE. CALL LIGHT IN PLACE, BED LOCKED AT THE LOWEST 
POSITION, SITTER AT BEDSIDE.

## 2020-02-16 VITALS — SYSTOLIC BLOOD PRESSURE: 137 MMHG

## 2020-02-16 VITALS — SYSTOLIC BLOOD PRESSURE: 131 MMHG

## 2020-02-16 VITALS — SYSTOLIC BLOOD PRESSURE: 143 MMHG

## 2020-02-16 VITALS — SYSTOLIC BLOOD PRESSURE: 141 MMHG

## 2020-02-16 VITALS — SYSTOLIC BLOOD PRESSURE: 124 MMHG

## 2020-02-16 VITALS — SYSTOLIC BLOOD PRESSURE: 123 MMHG

## 2020-02-16 LAB
ANION GAP SERPL CALCULATED.3IONS-SCNC: 7 MMOL/L (ref 5–15)
APPEARANCE UR: CLEAR
BASOPHILS # BLD AUTO: 0 K/UL (ref 0–0.2)
BASOPHILS NFR BLD AUTO: 0.5 % (ref 0–2)
BILIRUB UR QL STRIP: NEGATIVE
BUN SERPL-MCNC: 6 MG/DL (ref 8–21)
CALCIUM SERPL-MCNC: 9 MG/DL (ref 8.4–11)
CHLORIDE SERPL-SCNC: 100 MMOL/L (ref 98–107)
COLOR UR: YELLOW
CREAT SERPL-MCNC: 0.54 MG/DL (ref 0.55–1.3)
EOSINOPHIL # BLD AUTO: 0.2 K/UL (ref 0–0.4)
EOSINOPHIL NFR BLD AUTO: 2.1 % (ref 0–4)
ERYTHROCYTE [DISTWIDTH] IN BLOOD BY AUTOMATED COUNT: 13 % (ref 9–15)
GFR SERPL CREATININE-BSD FRML MDRD: 214 ML/MIN (ref 90–?)
GLUCOSE SERPL-MCNC: 106 MG/DL (ref 70–99)
GLUCOSE UR STRIP-MCNC: NEGATIVE MG/DL
HCT VFR BLD AUTO: 42.7 % (ref 36–54)
HGB BLD-MCNC: 14.5 G/DL (ref 14–18)
HGB UR QL STRIP: NEGATIVE
INR PPP: 1.1 (ref 0.8–1.2)
KETONES UR STRIP-MCNC: NEGATIVE MG/DL
LEUKOCYTE ESTERASE UR QL STRIP: NEGATIVE
LYMPHOCYTES # BLD AUTO: 1.2 K/UL (ref 1–5.5)
LYMPHOCYTES NFR BLD AUTO: 15.2 % (ref 20.5–51.5)
MCH RBC QN AUTO: 35 PG (ref 27–31)
MCHC RBC AUTO-ENTMCNC: 34 % (ref 32–36)
MCV RBC AUTO: 103 FL (ref 79–98)
MONOCYTES # BLD MANUAL: 1.6 K/UL (ref 0–1)
MONOCYTES # BLD MANUAL: 20.3 % (ref 1.7–9.3)
NEUTROPHILS # BLD AUTO: 4.9 K/UL (ref 1.8–7.7)
NEUTROPHILS NFR BLD AUTO: 61.9 % (ref 40–70)
NITRITE UR QL STRIP: NEGATIVE
PH UR STRIP: 6.5 [PH] (ref 5–8)
PLATELET # BLD AUTO: 391 K/UL (ref 130–430)
POTASSIUM SERPL-SCNC: 3.8 MMOL/L (ref 3.5–5.1)
PROT UR QL STRIP: NEGATIVE
PROTHROMBIN TIME: 10.8 SECS (ref 9.5–12.5)
RBC # BLD AUTO: 4.15 MIL/UL (ref 4.2–6.2)
SODIUM SERPLBLD-SCNC: 137 MMOL/L (ref 136–145)
SP GR UR STRIP: 1.01 (ref 1–1.03)
UROBILINOGEN UR STRIP-MCNC: 0.2 MG/DL (ref 0.2–1)
WBC # BLD AUTO: 7.9 K/UL (ref 4.8–10.8)

## 2020-02-16 RX ADMIN — Medication SCH EA: at 11:35

## 2020-02-16 RX ADMIN — CLINDAMYCIN PHOSPHATE SCH MLS/HR: 150 INJECTION, SOLUTION INTRAMUSCULAR; INTRAVENOUS at 23:25

## 2020-02-16 RX ADMIN — CLINDAMYCIN PHOSPHATE SCH MLS/HR: 150 INJECTION, SOLUTION INTRAMUSCULAR; INTRAVENOUS at 05:23

## 2020-02-16 RX ADMIN — Medication SCH EA: at 17:37

## 2020-02-16 RX ADMIN — Medication SCH MG: at 08:19

## 2020-02-16 RX ADMIN — CLINDAMYCIN PHOSPHATE SCH MLS/HR: 150 INJECTION, SOLUTION INTRAMUSCULAR; INTRAVENOUS at 14:09

## 2020-02-16 RX ADMIN — CEFAZOLIN SODIUM SCH MLS/HR: 2 SOLUTION INTRAVENOUS at 22:32

## 2020-02-16 RX ADMIN — DEXTROSE MONOHYDRATE SCH MLS/HR: 50 INJECTION, SOLUTION INTRAVENOUS at 05:46

## 2020-02-16 RX ADMIN — SODIUM CHLORIDE SCH MLS/HR: 9 INJECTION, SOLUTION INTRAVENOUS at 10:40

## 2020-02-16 RX ADMIN — Medication SCH EA: at 06:36

## 2020-02-16 RX ADMIN — Medication SCH MG: at 08:18

## 2020-02-16 RX ADMIN — HALOPERIDOL SCH MG: 1 TABLET ORAL at 22:34

## 2020-02-16 RX ADMIN — Medication SCH UNIT: at 08:19

## 2020-02-16 RX ADMIN — POTASSIUM CHLORIDE SCH MEQ: 20 TABLET, EXTENDED RELEASE ORAL at 09:00

## 2020-02-16 RX ADMIN — Medication SCH EA: at 23:25

## 2020-02-16 RX ADMIN — Medication SCH CAP: at 22:34

## 2020-02-16 RX ADMIN — DEXTROSE MONOHYDRATE SCH MLS/HR: 50 INJECTION, SOLUTION INTRAVENOUS at 12:54

## 2020-02-16 RX ADMIN — SODIUM CHLORIDE SCH MLS/HR: 9 INJECTION, SOLUTION INTRAVENOUS at 01:20

## 2020-02-16 NOTE — NUR
ROUTINE MEDS

ROUTINE MEDS ADMINISTERED AS ORDERED PER MD, EDUCATION GIVEN, TOLERATED WELL. NO ACUTE 
DISTRESS NOTED. ALL NEEDS MET. CALL LIGHT IN REACH. CONTINUE TO MONITOR.

## 2020-02-16 NOTE — NUR
Rounds:



Patient is sleeping. No acute distress. Even, unlabored respirations on room air. IV site 
patent and benign. Sitter at bedside. Will continue monitoring.

## 2020-02-16 NOTE — NUR
ACCUCHECK DONE, EDUCATION GIVEN, TOLERATED WELL. 125 MG/DL, NO INSULIN COVERAGE NEEDED PER 
SLIDING SCALE. NO ACUTE DISTRESS NOTED. ALL NEEDS MET. CALL LIGHT IN REACH. CONTINUE TO 
MONITOR.

## 2020-02-16 NOTE — NUR
Closing note:



Patient is sleeping. No acute distress. Even and unlabored breathing on room air. IV site 
patent and intact. Blood sugar this AM was 121. Patient kept NPO for procedure scheduled for 
today. Patient able to ambulate to bathroom steadily with standby assist. All needs met. 
Safety, fall precautions observed. Sitter remained at bedside entire shift. Will endorse 
care to dayshift RN.

## 2020-02-16 NOTE — NUR
OPENING NOTES

PT AWAKE AND ALERT. NONLABORED BREATHING NOTED ON ROOM AIR. O2 SAT 96%. IV LINE INTACT AND 
PATENT, NO SIGNS OF INFILTRATION NOTED. PT DENIES PAIN. NO ACUTE DISTRESS NOTED. BED LOCKED 
AND IN LOWEST POSITION. ALL NEEDS MET. CALL LIGHT IN REACH. SITTER PRESENT. CONTINUE TO 
MONITOR.

## 2020-02-16 NOTE — NUR
NPO:



Patient is NPO for procedure. Patient was educated regarding NPO status, verbalized 
understanding. Will continue monitoring.

## 2020-02-16 NOTE — NUR
POSTPONE SURGERY

CHARGE NURSE, MAGDALENA SPOKE TO DR. INGRAM REGARDING SURGERY ASKING IF IT WILL BE DONE TODAY. DR. INGRAM VERBALIZED THAT SURGERY WILL BE POSTPONED. ORDERS VERIFIED AND CARRIED OUT. PT BACK ON 
REGULAR DIET.

## 2020-02-16 NOTE — NUR
TAMIKO GRISSOM

DID NOT ADMINISTER HALDOL TO PT. PT PLANNING TO HAVE SURGERY TOMORROW AND NEEDS TO GIVE 
CONSENT. DR. INGRAM SUGGESTS TO HOLD HALDOL SO PT IS ABLE TO GIVE CONSENT FOR SURGERY.

## 2020-02-16 NOTE — NUR
ROUNDS

PT AWAKE AND ALERT. NO ACUTE DISTRESS NOTED. SITTER IN ROOM. ALL NEEDS MET. CALL LIGHT IN 
REACH, CONTINUE TO MONITOR.

## 2020-02-16 NOTE — NUR
Late entry due to patient care:

Pt is fully awake, alert and oriented x4. Skin is warm and dry to touch. No signs or 
symptoms of hypoglycemia or hyperglycemia noted. Saline lock in RFA is without any signs of 
infiltration. No c/o pain or discomfort at this time. Fall and safety precautions are in 
place.

## 2020-02-16 NOTE — NUR
PT INDEPENDENTLY DID BED BATH, TOLERATED WELL. ALL NEEDS MET. CALL LIGHT IN REACH. CONTINUE 
TO MONITOR.

## 2020-02-16 NOTE — NUR
Accucheck 92 and no Insulin coverage needed. Skin remains warm and dry to touch. Pt ate 1 
cup of Jello and drank 1 cup of Apple juice for snacks. Fall and safety precautions are in 
place.

## 2020-02-16 NOTE — NUR
ROUNDS

PT NPO, NO PO MEDS ADMINISTERED. NO ACUTE DISTRESS NOTED. ALL NEEDS MET. CALL LIGHT IN 
REACH. CONTINUE TO MONITOR.

## 2020-02-16 NOTE — NUR
Nutrition Update



Nader Scale 15 noted.

Pt admitted for left elbow cellulitis

Diet: NPO

BMI: 42.0 kg/m2



RD to follow per nutrition care standards.

## 2020-02-17 VITALS — SYSTOLIC BLOOD PRESSURE: 134 MMHG

## 2020-02-17 VITALS — SYSTOLIC BLOOD PRESSURE: 136 MMHG

## 2020-02-17 VITALS — SYSTOLIC BLOOD PRESSURE: 130 MMHG

## 2020-02-17 PROCEDURE — 0R9M0ZZ DRAINAGE OF LEFT ELBOW JOINT, OPEN APPROACH: ICD-10-PCS | Performed by: ORTHOPAEDIC SURGERY

## 2020-02-17 RX ADMIN — Medication SCH UNIT: at 09:09

## 2020-02-17 RX ADMIN — CLINDAMYCIN PHOSPHATE SCH MLS/HR: 150 INJECTION, SOLUTION INTRAMUSCULAR; INTRAVENOUS at 13:59

## 2020-02-17 RX ADMIN — HALOPERIDOL SCH MG: 1 TABLET ORAL at 21:49

## 2020-02-17 RX ADMIN — CEFAZOLIN SODIUM SCH MLS/HR: 2 SOLUTION INTRAVENOUS at 05:08

## 2020-02-17 RX ADMIN — CEFAZOLIN SODIUM SCH MLS/HR: 2 SOLUTION INTRAVENOUS at 21:50

## 2020-02-17 RX ADMIN — CLINDAMYCIN PHOSPHATE SCH MLS/HR: 150 INJECTION, SOLUTION INTRAMUSCULAR; INTRAVENOUS at 06:50

## 2020-02-17 RX ADMIN — HALOPERIDOL SCH MG: 1 TABLET ORAL at 15:00

## 2020-02-17 RX ADMIN — POTASSIUM CHLORIDE SCH MEQ: 20 TABLET, EXTENDED RELEASE ORAL at 09:09

## 2020-02-17 RX ADMIN — Medication SCH EA: at 12:25

## 2020-02-17 RX ADMIN — Medication SCH MG: at 09:09

## 2020-02-17 RX ADMIN — Medication SCH EA: at 17:55

## 2020-02-17 RX ADMIN — CLINDAMYCIN PHOSPHATE SCH MLS/HR: 150 INJECTION, SOLUTION INTRAMUSCULAR; INTRAVENOUS at 21:51

## 2020-02-17 RX ADMIN — Medication SCH CAP: at 09:08

## 2020-02-17 RX ADMIN — HALOPERIDOL SCH MG: 1 TABLET ORAL at 09:09

## 2020-02-17 RX ADMIN — Medication SCH CAP: at 21:49

## 2020-02-17 RX ADMIN — CEFAZOLIN SODIUM SCH MLS/HR: 2 SOLUTION INTRAVENOUS at 13:21

## 2020-02-17 RX ADMIN — Medication SCH EA: at 05:07

## 2020-02-17 RX ADMIN — HUMAN INSULIN PRN UNITS: 100 INJECTION, SOLUTION SUBCUTANEOUS at 12:25

## 2020-02-17 RX ADMIN — Medication SCH EA: at 21:51

## 2020-02-17 NOTE — NUR
INITIAL ROUNDS

Received pt AAOx4, no s/s resp distress, no c/o pain or discomfort. IVF infusing well to 
right hand at ordered rate with no s/s infiltration to site. Pt observed ambulating to the 
bathroom with steady gait. Plan of care for the reviewed with pt-pt verbalized his 
understanding. Pain management, disease process, skin and safety discussed-teach back done. 
Call light within reach.

## 2020-02-17 NOTE — NUR
RECEIVED PT IN BED FROM RECOVERY ROOM, V/S AND ASSESSMENT DONE ,IV MEDS GIVEN ,LFT UPPER ARM 
WITH SURGICAL BANDAGE INTACT ,NO DISTRESS NOTED AT THIS TIME

## 2020-02-17 NOTE — NUR
Received report from Sophia at 1200.  Received pt A/Ox4.  No c/o pain.  No evidence of 
delirium, hallucination, or tremors.  1600/1700 meds held for pending surgery.  Pt taken to 
OR at 1730.  NPO X meds since midnight.  Preop checklist completed and consent signed.

## 2020-02-17 NOTE — NUR
Nutrition F/U 



RD reviewed pt's current EMR including diet Hx, physician notes, nursing notes, pertinent 
labs/meds/procedures, care trends and care activity.



Current Diet Order: NPO x 0 day



Subjective information: Pt seen sleeping soundly in bed earlier. Per RN report, pt ahd 100% 
of breakfast and has been NPO since breakfast as pt is scheduled  for I&D this afternoon. 
Per MD notes, pt w/ sepsis and is pre-diabetic. HbA1c 6.2H (2/11/2020). Pt appetite varies 
from % of meals per PO intake records. No pressure injuries noted, pt was seen by 
Wound Specialist on 2/13 and noted erythema to LUE. Advance diet w/in 24 hrs post procedure. 




Current PO intake: % of meals.



Estimated Energy Expenditure (kcals/day) 

1033-7578 kcal/day (MSJ x 1.2-1.5 CBW for sepsis)

Estimated Protein Required (g/day) 

117-156 gm/day (1.5-2 gm/kg CBW for sepsis)

Estimated Fluid Required (l/day) 

2.4-3 L/day (1 ml/kcal/day for maintenance)

Problem/Etiology/Signs/Symptoms 

Increased nutritional needs related to metabolic demands as evidenced by 

estimated nutritional requirements for sepsis. (*ongoing)



Inadequate protein-calorie intake r/t diet interruption AEB NPO status prior to

procedure. (*new)

Expected Outcomes/Goals 

- Monitor advancement of diet, appetite and PO intakes w/ goal of pt meeting 

at least 80% of estimated nutritional needs, labs trending WNL, normal GI function, 

and skin integrity/wt maintenance

Dietitian Recommendations 

* Advance diet in 24 hrs. 

Follow Up 

Mod Risk: F/U in 3-5 days

SUSANA SUTTON

## 2020-02-17 NOTE — NUR
Pt is awake and not in any distress. Pt has been elevating his left arm while awake. Pt 
reminded of nothing by mouth after breakfast and pt verbalized understanding. Pt stated 
Surgeon informed his his surgery will be around 4:00pm today. Saline lock in RH is without 
any signs of infiltration.

## 2020-02-18 VITALS — SYSTOLIC BLOOD PRESSURE: 110 MMHG

## 2020-02-18 VITALS — SYSTOLIC BLOOD PRESSURE: 145 MMHG

## 2020-02-18 LAB
ANION GAP SERPL CALCULATED.3IONS-SCNC: 5 MMOL/L (ref 5–15)
BUN SERPL-MCNC: 8 MG/DL (ref 8–21)
CALCIUM SERPL-MCNC: 9.2 MG/DL (ref 8.4–11)
CHLORIDE SERPL-SCNC: 99 MMOL/L (ref 98–107)
CREAT SERPL-MCNC: 0.66 MG/DL (ref 0.55–1.3)
GFR SERPL CREATININE-BSD FRML MDRD: 169 ML/MIN (ref 90–?)
GLUCOSE SERPL-MCNC: 97 MG/DL (ref 70–99)
POTASSIUM SERPL-SCNC: 3.7 MMOL/L (ref 3.5–5.1)
SODIUM SERPLBLD-SCNC: 133 MMOL/L (ref 136–145)

## 2020-02-18 RX ADMIN — CLINDAMYCIN PHOSPHATE SCH MLS/HR: 150 INJECTION, SOLUTION INTRAMUSCULAR; INTRAVENOUS at 05:52

## 2020-02-18 RX ADMIN — Medication SCH CAP: at 10:17

## 2020-02-18 RX ADMIN — Medication SCH EA: at 17:23

## 2020-02-18 RX ADMIN — Medication SCH MG: at 10:16

## 2020-02-18 RX ADMIN — Medication SCH EA: at 11:50

## 2020-02-18 RX ADMIN — HALOPERIDOL SCH MG: 1 TABLET ORAL at 14:15

## 2020-02-18 RX ADMIN — Medication SCH MG: at 10:17

## 2020-02-18 RX ADMIN — POTASSIUM CHLORIDE SCH MEQ: 20 TABLET, EXTENDED RELEASE ORAL at 10:17

## 2020-02-18 RX ADMIN — Medication SCH UNIT: at 10:16

## 2020-02-18 RX ADMIN — HALOPERIDOL SCH MG: 1 TABLET ORAL at 14:24

## 2020-02-18 RX ADMIN — CEFAZOLIN SODIUM SCH MLS/HR: 2 SOLUTION INTRAVENOUS at 05:39

## 2020-02-18 RX ADMIN — Medication SCH EA: at 05:39

## 2020-02-18 NOTE — NUR
Patient asking when he will discharge. Will monitor as patient has antibiotics. Refuses 
dressing change. Patient says MD told him he will discharge. Social work visit complete 
2/17/20. Mike SEAMAN RN

## 2020-02-18 NOTE — NUR
Left against medical advice. IV site discontinued. Intact 20 gauge iv catheter appreciated. 
Says he will go to his aunt's house in [place]. Mike SEAMAN RN

## 2020-07-17 NOTE — NUR
Immunization record given   Initial note:



Received report from kenrick RN. Patient is awake, sitting up in bed. No acute distress. 
Tolerating room air. IV site to right AC is patent and benign, saline locked. Call light is 
with patient. Safety and fall precautions in place. Will continue with plan of care.

## 2023-09-11 NOTE — NUR
Chief Complaint   Patient presents with    Follow-up     Was seen on 9/4/2023 with back pain. States pain is getting better. 1. \"Have you been to the ER, urgent care clinic since your last visit? Hospitalized since your last visit?\"     2. \"Have you seen or consulted any other health care providers outside of the [address] [place] [address] since your last visit? \"      3. For patients aged 43-73: Has the patient had a colonoscopy / FIT/ Cologuard? If the patient is female:    4. For patients aged 43-66: Has the patient had a mammogram within the past 2 years? 5. For patients aged 21-65: Has the patient had a pap smear?        Okay to discontinue medications no longer taking per verbal order ** pt coughed out large (2-3ml), thick, white phlegm.